# Patient Record
Sex: FEMALE | Race: WHITE | NOT HISPANIC OR LATINO | URBAN - METROPOLITAN AREA
[De-identification: names, ages, dates, MRNs, and addresses within clinical notes are randomized per-mention and may not be internally consistent; named-entity substitution may affect disease eponyms.]

---

## 2024-11-29 ENCOUNTER — OFFICE VISIT (OUTPATIENT)
Dept: URGENT CARE | Age: 24
End: 2024-11-29
Payer: COMMERCIAL

## 2024-11-29 VITALS
BODY MASS INDEX: 23.24 KG/M2 | SYSTOLIC BLOOD PRESSURE: 114 MMHG | OXYGEN SATURATION: 96 % | TEMPERATURE: 98.8 F | DIASTOLIC BLOOD PRESSURE: 81 MMHG | WEIGHT: 124 LBS | HEART RATE: 108 BPM

## 2024-11-29 DIAGNOSIS — J01.10 ACUTE NON-RECURRENT FRONTAL SINUSITIS: ICD-10-CM

## 2024-11-29 DIAGNOSIS — J02.9 SORE THROAT: Primary | ICD-10-CM

## 2024-11-29 LAB — POC RAPID STREP: NEGATIVE

## 2024-11-29 RX ORDER — ETONOGESTREL 68 MG/1
68 IMPLANT SUBCUTANEOUS
COMMUNITY

## 2024-11-29 RX ORDER — AMOXICILLIN 875 MG/1
875 TABLET, FILM COATED ORAL 2 TIMES DAILY
Qty: 20 TABLET | Refills: 0 | Status: SHIPPED | OUTPATIENT
Start: 2024-11-29 | End: 2024-12-09

## 2024-11-29 NOTE — PROGRESS NOTES
Subjective   Patient ID: Brittany Watson is a 24 y.o. female. They present today with a chief complaint of Sore Throat (Sore throat x 3 days).    History of Present Illness  HPI  Patient is a 24-year-old female who presents with sore throat.  She states she has had some sinus congestion and drainage.  She denies any fevers or chills.  She has been using over-the-counter medication for symptoms at home.  Past Medical History  Allergies as of 11/29/2024    (No Known Allergies)       (Not in a hospital admission)         Past Medical History:   Diagnosis Date    Abscess of right external ear 11/26/2019    Abscess of right earlobe       No past surgical history on file.         Review of Systems  Review of Systems  CONSTITUTIONAL: No for fever, no chills, no recent weight loss + headache  EYES: No pain, No redness, No swelling  EARS: No discharge, No pain, No hearing loss  NOSE: + congestion, No discharge, No bleeding  MOUTH: + throat pain, No hoarseness  NECK: No pain, No stiffness, No swollen glands  CARDIOVASCULAR: No chest pain, No edema, No irregular rhythm, No palpitations  RESPIRATORY: No cough, No dyspnea  GI: No abdominal pain, No constipation or diarrhea, No N/V  : No urgency, No dysuria, No increase or decrease in urine output, No hematuria  MUSCULOSKELETAL: No back pain, No joint pain, No swelling, No weakness  SKIN: No rash, No lesions, No abrasions  NEURO: No dizziness, No alteration in mentation, No headache, No loss of consciousness, No ataxia  PSYCH: No anxiety, No depression, No SI or HI                             Objective    Vitals:    11/29/24 1541   BP: 114/81   Pulse: 108   Temp: 37.1 °C (98.8 °F)   TempSrc: Oral   SpO2: 96%   Weight: 56.2 kg (124 lb)     No LMP recorded.    Physical Exam  Gen.: Vitals noted no distress afebrile. Normal phonation, no stridor, no trismus.   ENT: TMs clear bilaterally, EACs unremarkable. Mastoids nontender. Posterior oropharynx without erythema, exudate, or  swelling. Uvula is in the midline and nonedematous. No Lee's Angina. + rhinitis and maxillary sinus pressure on palpation + TM erythema bilaterally  Neck: Supple. No meningismus through full range of motion. No lymphadenopathy.   Cardiac: Regular rate rhythm no murmur.   Lungs: Good aeration throughout. No adventitious breath sounds.   Abdomen: Soft nontender nonsurgical throughout. Normoactive bowel sounds.   Extremities: No peripheral edema, negative Homans bilaterally no cords.   Skin: No rash.   Neuro: No focal neurologic deficits. NIH score is 0.  Procedures    Point of Care Test & Imaging Results from this visit  Results for orders placed or performed in visit on 11/29/24   POCT rapid strep A manually resulted    Collection Time: 11/29/24  3:53 PM   Result Value Ref Range    POC Rapid Strep Negative Negative     No results found.    Diagnostic study results (if any) were reviewed by JOCELYNN Sanchez.    Assessment/Plan   Allergies, medications, history, and pertinent labs/EKGs/Imaging reviewed by JOCELYNN Sanchez.     Medical Decision Making  History and physical is consistent with sinus infection.  Covid negative.  No signs of OM, OE, Strep.  Pt was prescribed abx and will  use flonase along with antihistamine and tylenol or motrin.  F/U with pcp    Orders and Diagnoses  Diagnoses and all orders for this visit:  Sore throat  -     POCT rapid strep A manually resulted        Medical Admin Record      Follow Up Instructions  No follow-ups on file.Pt was prescribed abx and will  use flonase along with antihistamine and tylenol or motrin.  F/U with pcp    Patient disposition:    Electronically signed by JOCELYNN Sanchez  4:02 PM